# Patient Record
Sex: MALE | Race: WHITE | URBAN - METROPOLITAN AREA
[De-identification: names, ages, dates, MRNs, and addresses within clinical notes are randomized per-mention and may not be internally consistent; named-entity substitution may affect disease eponyms.]

---

## 2020-05-07 ENCOUNTER — HISTORICAL (OUTPATIENT)
Dept: ADMINISTRATIVE | Facility: HOSPITAL | Age: 35
End: 2020-05-07

## 2020-07-15 ENCOUNTER — HISTORICAL (OUTPATIENT)
Dept: SURGERY | Facility: HOSPITAL | Age: 35
End: 2020-07-15

## 2022-04-10 ENCOUNTER — HISTORICAL (OUTPATIENT)
Dept: ADMINISTRATIVE | Facility: HOSPITAL | Age: 37
End: 2022-04-10

## 2022-04-30 VITALS
SYSTOLIC BLOOD PRESSURE: 141 MMHG | SYSTOLIC BLOOD PRESSURE: 148 MMHG | DIASTOLIC BLOOD PRESSURE: 85 MMHG | WEIGHT: 270.06 LBS | BODY MASS INDEX: 33.58 KG/M2 | HEIGHT: 75 IN | DIASTOLIC BLOOD PRESSURE: 91 MMHG | WEIGHT: 258 LBS | BODY MASS INDEX: 32.08 KG/M2 | HEIGHT: 75 IN

## 2022-05-02 NOTE — HISTORICAL [facility]
This is a historical note converted from Mode. Formatting and pictures may have been removed.  Please reference Mode for original formatting and attached multimedia. OPERATIVE REPORT  ?  DATE: 7/15/2020  ?  ASSISTANT: Pam Bello,?certified surgical assistant  ?  PREOPERATIVE DIAGNOSIS:  1. 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 Os acromiale  3.? Impingement syndrome  4.? Bicipital?tendinitis?and pain  ?  POSTOPERATIVE DIAGNOSIS:  Same  ?  PROCEDURES:  1. ?Right shoulder diagnostic anoscopy  2.? Distal clavicle resection  3.? Subacromial decompression  4.? Arthroscopic biceps tenotomy  ?  ANESTHESIA:  General anesthesia with?supraclavicular nerve block  ?  BLOOD LOSS:  Less than 30 cc  ?  DVT PROPHYLAXIS:  Aspirin daily for 10 days  ?  INSTRUMENTATION:  None  ?  PROCEDURE IN DETAIL:  ?  Diagnostic arthroscopy of shoulder  ?  The examination under anesthesia was performed for skin defects and other contraindications and none were found.The patient was carefully placed in a lateral decubitus position. All bony prominences were padded and sterile U-drape was applied. Patients operative extremity was placed in suspension device with 7-10lbs of weight applied. The skin was prepped in normal sterile fashion. A time out was performed to confirm correct operative extremity, allergies, and antibiotic infusion. All portals were made with an 11-blade and an 18-gauge spinal needle was used to help probe and find proper alignment for the portals. After creating posterior portal, an arthroscope was inserted into the glenohumeral joint. A diagnostic arthroscopy was then performed in this sequential order: biceps anchor, rotator interval, subscapularis tendon, superior glenohumeral ligament, middle glenohumeral ligament, inferior glenohumeral ligament, anterior and inferior capsular attachments, anterior, inferior, and posterior labrum, teres minor tendon, infraspinatus tendon, and supraspinatus tendon, and biceps  tendon in the rotator interval.  ?  The certified assistant provided critical assistance by holding instruments including the arthroscope to provide adequate visualization of the procedure, as well as assisting in wound closure.  ?  At the end of the procedure the portals were closed with mastisol around the wound and placement of steri-strips. The patient tolerated the procedure well and taken to the recovery room in good condition.  ?  Arthroscopic biceps Tenotomy  ?  Viewing through posterior portal, the base of the biceps tendon visualized and subluxed. The tendon was transected with arthroscopic scissors through the anterior portal.  ?  Distal clavicle resection  ?  A distal clavicle resection was then carried out. Viewing from the posterior portal, the shaver was used to debride the acromioclavicular joint and associated soft tissues through the anterior portal. Once cleared of the soft tissue, the nava was used to resect the distal end of the clavicle concentrating initially upon the inferior and distal clavicular spurs. The resection was then carried superiorly to remove the upper end of the clavicle to remove approximately 5-7mm of spurs. Then the nava was used on the acromion to remove 2-3mm of bone. The resection was then measured with a probe to confirm the 8-10mm of resection.?  ?  Subacromial decompression  ?  Viewing from the anterior lateral portal, we could visualize the anterolateral aspect of the acromion. ?The nava was placed in the posterior portal and the decompression was started at the anterolateral corner of the acromion. ?We moved in a medial to lateral configuration and moved from anterior to posterior to do the decompression. ?This is described as the cutting block technique  ???  A 3 cm incision was made into the axillary fold. I carefully slid a blunt Hohmann over the lateral edge of the humerus just under the pectoralis tendon. The arm was externally rotated and I cannot visualize the  biceps tendon.??It was not located in the traditional group.??I believe this is because?this patient had a chronic?pectoralis?tendon rupture and the pectoralis tendon actually acts as a?bolster for the?biceps tendon.? So since this patient?had a pectoralis?rupture?the?biceps?subluxed out of the groove and was not able to be visualized.  ?

## 2022-05-02 NOTE — HISTORICAL [facility]
This is a historical note converted from Mode. Formatting and pictures may have been removed.  Please reference Mode for original formatting and attached multimedia. Chief Complaint  New patient here with right shoulder- pec muscle tear. DOI: 12/5/19 Bench pressing. Doing PTwith [facility]. States does help at time. Xrays today.  History of Present Illness  This is a 35-year-old male that is in the , specifically the [employer], that?comes in with a 5-month history of a pectoralis?tendon tear.? 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 He did have somewhat evaluated?but he decided to treat it conservatively.? He has been in therapy recently for the last 4 to 6 weeks but he still continues to have?issues. ?His major problem is pain over the anterolateral aspect of the superior shoulder?and also some pain within the bicipital groove.? He does notice some weakness in his?right?pectoralis muscle.  Review of Systems  GENERAL: No fevers, chills, unexpected weight loss or gain  MUSCULOSKELETAL: Joint pain, extremity pain  Physical Exam  Vitals & Measurements  HR:?76(Peripheral)? BP:?148/85?  HT:?190?cm? WT:?122.5?kg? BMI:?33.93?  General: Well-developed, well-nourished.  Neuro: Alert and oriented x 3.  Psych: Normal mood and affect.  CV: Palpable radial pulses.  Resp: Smooth and unlabored.  Skin: No evidence of focal lesions or trauma.  Hem/Imm/Lymph: No evidence of lymphangitis or adenopathy.  Gait: No trendelenburg gait.  DTR: 2+, no hypo or hyperreflexia.  Coordination and Balance: No tremors or abnormal station.?  Shoulder Exam:  No obvious deformity.? Atrophy of the?sternal head of the right?pectoralis muscle. ?No medial or lateral scapula winging. Forward flexion to 170 degrees and abduction to 170 degrees and external rotation 80 degrees is and internal rotation is 80 degrees. Negative empty can, Whipple, Drop Arm Test, James, positive?impingement, positive?AC joint tenderness. Negative biceps ?groove tenderness,  Duran?s, Yergason?s, Speed test. Negative Apprehension and Relocation test. 5/5 strength, normal skin appearance and palpable pulses distally. Sensibility normal.  Assessment/Plan  1.?Os acromiale of right shoulder?M25.811  ?We discussed multiple options and he has decided he wants to?proceed with surgery. ?He wants to discuss this with his wife initially.? I gave him some reading material?to review. ?I will call us back when he is?ready to proceed with surgery. ?The goal be to do a?sub-pec tenodesis?and an arthroscopic?distal clavicle resection of his os acromiale?and a subacromial decompression.  Ordered:  Office/Outpatient Visit Level 3 New [id number] PC, Os acromiale of right shoulder  Biceps tendinitis on right  Pectoral muscle rupture, [facility], 05/07/20 14:48:00 CDT  ?  2.?Biceps tendinitis on right?M75.21  Ordered:  Office/Outpatient Visit Level 3 New [id number] PC, Os acromiale of right shoulder  Biceps tendinitis on right  Pectoral muscle rupture, [facility], 05/07/20 14:48:00 CDT  ?  3.?Pectoral muscle rupture?S29.011A  ?I believe he does have a pectoralis muscle rupture as well?but unfortunately he is 5 months out from this injury.? There is really nothing we can do at this time.  Ordered:  Office/Outpatient Visit Level 3 New [id number] [facility], Os acromiale of right shoulder  Biceps tendinitis on right  Pectoral muscle rupture, [facility], 05/07/20 14:48:00 CDT  ?   Problem List/Past Medical History  Ongoing  Obesity  Historical  No qualifying data  Procedure/Surgical History  Tonsil   Medications  No active medications  Allergies  No Known Medication Allergies  Social History  Abuse/Neglect  No, 05/07/2020  Alcohol  Current, Beer, Liquor, 05/07/2020  Substance Use  Never, 05/07/2020  Tobacco  Never (less than 100 in lifetime), No, 05/07/2020  Health Maintenance  Health Maintenance  ???Pending?(in the next year)  ??? ??OverDue  ??? ? ? ?Alcohol Misuse Screening due??01/01/20??and  every 1??year(s)  ??? ??Due?  ??? ? ? ?Tetanus Vaccine due??05/07/20??and every 10??year(s)  ??? ??Due In Future?  ??? ? ? ?Obesity Screening not due until??01/01/21??and every 1??year(s)  ???Satisfied?(in the past 1 year)  ??? ??Satisfied?  ??? ? ? ?ADL Screening on??05/07/20.??Satisfied by Ruth Hickey L. L.  ??? ? ? ?Blood Pressure Screening on??05/07/20.??Satisfied by Ruth Hickey L.  ??? ? ? ?Body Mass Index Check on??05/07/20.??Satisfied by Ruth Hickey L. L.  ??? ? ? ?Obesity Screening on??05/07/20.??Satisfied by Ruth Hickey LAngelita L.  ?

## 2025-03-31 ENCOUNTER — OFFICE VISIT (OUTPATIENT)
Dept: ORTHOPEDICS | Facility: CLINIC | Age: 40
End: 2025-03-31
Payer: OTHER GOVERNMENT

## 2025-03-31 VITALS
SYSTOLIC BLOOD PRESSURE: 157 MMHG | WEIGHT: 270 LBS | DIASTOLIC BLOOD PRESSURE: 96 MMHG | HEART RATE: 130 BPM | HEIGHT: 75 IN | BODY MASS INDEX: 33.57 KG/M2

## 2025-03-31 DIAGNOSIS — S76.112A RUPTURE OF LEFT QUADRICEPS TENDON, INITIAL ENCOUNTER: ICD-10-CM

## 2025-03-31 DIAGNOSIS — M25.562 LEFT KNEE PAIN, UNSPECIFIED CHRONICITY: Primary | ICD-10-CM

## 2025-03-31 PROCEDURE — 99203 OFFICE O/P NEW LOW 30 MIN: CPT | Mod: ,,, | Performed by: ORTHOPAEDIC SURGERY

## 2025-03-31 RX ORDER — AMLODIPINE BESYLATE 5 MG/1
5 TABLET ORAL EVERY MORNING
COMMUNITY
Start: 2025-03-18

## 2025-03-31 NOTE — PROGRESS NOTES
"Subjective:    CC: Pain of the Left Knee (L knee pain. Started hurting 3/17/25, went to ER a few days after. Pt seems to think he tweaked it as he was going up the stairs, felt like something rolled across knee. Couldn't bend it towards the end of day and had a ridge at the top of his patella. Had a lot of swelling. Has tingling at times. Flared up again this morning but pain came back when he kneeled. )       HPI:  Patient comes in today for his 1st visit.  Patient complains of left knee pain.  Patient states he twisted his knee, had severe pain, difficulty with any type of walking and bending, was seen in the ER, continues to have painful swelling, popping in his knee.  He states over the last couple of weeks, it has not improved.    ROS: Refer to HPI for pertinent ROS. All other 12 point systems negative.    Objective:  Vitals:    03/31/25 1458   BP: (!) 157/96   BP Location: Left arm   Patient Position: Sitting   Pulse: (!) 130   Weight: 122.5 kg (270 lb)   Height: 6' 3" (1.905 m)        Physical Exam:  The patient is well-nourished, well-developed and in no apparent distress, pleasant and cooperative. Examination of the left lower extremity compartments are soft and warm. Skin is intact. There are no signs or symptoms of DVT or infection. There is a small joint effusion. There is no erythema. Tender to palpation along the superior pole of the patella, pain with any flexion of his knee, he is tender along the quad tendon mainly , left knee range of motion is 0-95. The knee is stable to exam with varus and valgus stressing. Negative anterior and posterior drawer. Negative Lachman´s.  Negative Ovi's test. Patella grind is positive, Negative for apprehension. Neurovascularly intact distally.    Images:  X-rays three views left knee demonstrate no obvious fracture or dislocation. Images Reviewed and discussed with patient.    Assessment:  1. Left knee pain, unspecified chronicity  - X-Ray Knee 3 View Left; " Future    2. Rupture of left quadriceps tendon, initial encounter  - MRI Knee Without Contrast Left        Plan:  At this time we discussed his physical exam and x-ray findings.  I am concerned for at least a partial tear of the quad tendon.  Patient remains be symptomatic.  We will proceed with an MRI of his left knee.  I would like see back later this week for his results.    Follow UP: No follow-ups on file.

## 2025-03-31 NOTE — LETTER
[facility] [address]  Phone: [phone]  Fax: [phone]      Patient Name:Timmy Lynch YOB: 1985    Date: 3/31/25       The above mentioned patient was seen by me on 3/31/25. Patient is being treated for a partial quad tear. Please excuse him from the following activities:    -No running, strenuous activity, climbing, jumping, or carrying over 25 pounds.       Sarmad Li MD / Yenni Patel PA-C

## 2025-04-07 ENCOUNTER — OFFICE VISIT (OUTPATIENT)
Dept: ORTHOPEDICS | Facility: CLINIC | Age: 40
End: 2025-04-07
Payer: OTHER GOVERNMENT

## 2025-04-07 VITALS
BODY MASS INDEX: 33.58 KG/M2 | WEIGHT: 270.06 LBS | HEIGHT: 75 IN | HEART RATE: 110 BPM | DIASTOLIC BLOOD PRESSURE: 100 MMHG | SYSTOLIC BLOOD PRESSURE: 156 MMHG | TEMPERATURE: 98 F

## 2025-04-07 DIAGNOSIS — S76.112D RUPTURE OF LEFT QUADRICEPS TENDON, SUBSEQUENT ENCOUNTER: Primary | ICD-10-CM

## 2025-04-07 PROCEDURE — 99213 OFFICE O/P EST LOW 20 MIN: CPT | Mod: ,,, | Performed by: ORTHOPAEDIC SURGERY

## 2025-04-07 RX ORDER — TRAMADOL HYDROCHLORIDE 50 MG/1
50 TABLET ORAL EVERY 8 HOURS PRN
COMMUNITY
Start: 2025-03-18

## 2025-04-07 RX ORDER — HYDROCHLOROTHIAZIDE 12.5 MG/1
12.5 CAPSULE ORAL EVERY MORNING
COMMUNITY
Start: 2025-03-18

## 2025-04-08 NOTE — PROGRESS NOTES
"Subjective:    CC: Results of the Left Knee ( L knee MRi results , reports having some soreness and achy since MRI, denies any increase or decrease in pain, wbat, ambulates without assistance, )       HPI:  Patient returns today for repeat exam.  Patient had an MRI of his left knee.  We have discussed his results.    ROS: Refer to HPI for pertinent ROS. All other 12 point systems negative.    Objective:  Vitals:    04/07/25 1532   BP: (!) 156/100   BP Location: Left arm   Patient Position: Sitting   Pulse: 110   Temp: 98.2 °F (36.8 °C)   Weight: 122.5 kg (270 lb 1 oz)   Height: 6' 3" (1.905 m)        Physical Exam:  Left lower extremity compartment soft and warm.  Skin is intact.  There is no signs symptoms of DVT or infection.  He remains be tender along the superior pole of the patella.  He does have some discomfort with resisted knee flexion.  Otherwise he has full motion is 0-125 degrees negative instability, neurovascular intact distally.    Images:  MRI left knee was reviewed. Images Reviewed and discussed with patient.    Assessment:  1. Rupture of left quadriceps tendon, subsequent encounter        Plan:  At this time we discussed his physical exam and MRI findings.  We have discussed various treatment options.  We have discussed a knee brace, low-impact activities, anti-inflammatories with appropriate precautions.  I would like see him back in 4 weeks to see how he is progressing.  He will refrain from any strenuous activity.    Follow UP: No follow-ups on file.              "

## 2025-05-14 ENCOUNTER — OFFICE VISIT (OUTPATIENT)
Dept: ORTHOPEDICS | Facility: CLINIC | Age: 40
End: 2025-05-14
Payer: OTHER GOVERNMENT

## 2025-05-14 VITALS — HEIGHT: 75 IN | WEIGHT: 270.06 LBS | BODY MASS INDEX: 33.58 KG/M2

## 2025-05-14 DIAGNOSIS — M25.462 EFFUSION OF LEFT KNEE JOINT: ICD-10-CM

## 2025-05-14 DIAGNOSIS — S76.112D RUPTURE OF LEFT QUADRICEPS TENDON, SUBSEQUENT ENCOUNTER: Primary | ICD-10-CM

## 2025-05-14 PROCEDURE — 99213 OFFICE O/P EST LOW 20 MIN: CPT | Mod: ,,, | Performed by: ORTHOPAEDIC SURGERY

## 2025-05-15 NOTE — PROGRESS NOTES
"Subjective:    CC: Pain of the Left Knee (L knee / quad pain - pt states that he has good and bad days depending on what he does. He is still having swelling. )       HPI:  Patient returns today for repeat exam.  Patient states overall his knee is improving.  He states he still feels it some in his knee.  Occasional swelling, denies any new complaints.  We have discussed his previous MRI.    ROS: Refer to HPI for pertinent ROS. All other 12 point systems negative.    Objective:  Vitals:    05/14/25 0759   BP: Comment: patient hasnt taken his bp meds yet   Weight: 122.5 kg (270 lb 1 oz)   Height: 6' 3" (1.905 m)        Physical Exam:  The patient is well-nourished, well-developed and in no apparent distress, pleasant and cooperative. Examination of the left lower extremity compartments are soft and warm. Skin is intact. There are no signs or symptoms of DVT or infection. There is a small joint effusion. There is no erythema. Tender to palpation along the anterior aspect , left knee range of motion is 0-110. The knee is stable to exam with varus and valgus stressing. Negative anterior and posterior drawer. Negative Lachman´s.  Negative Ovi's test. Patella grind is positive, Negative for apprehension. Neurovascularly intact distally.    Images: . Images Reviewed and discussed with patient.    Assessment:  1. Rupture of left quadriceps tendon, subsequent encounter    2. Effusion of left knee joint        Plan:  At this time we discussed his physical exam and previous imaging findings.  We will continue conservative treatment.  We have discussed some knee exercises, letting pain be his guide.  I would like see him back in 4 weeks to see how he is progressing.    Follow UP: No follow-ups on file.              "

## 2025-06-18 ENCOUNTER — OFFICE VISIT (OUTPATIENT)
Dept: ORTHOPEDICS | Facility: CLINIC | Age: 40
End: 2025-06-18
Payer: OTHER GOVERNMENT

## 2025-06-18 DIAGNOSIS — M25.571 CHRONIC PAIN OF RIGHT ANKLE: Primary | ICD-10-CM

## 2025-06-18 DIAGNOSIS — M19.071 LOCALIZED OSTEOARTHRITIS OF RIGHT ANKLE: ICD-10-CM

## 2025-06-18 DIAGNOSIS — G89.29 CHRONIC PAIN OF RIGHT ANKLE: Primary | ICD-10-CM

## 2025-06-18 DIAGNOSIS — S76.112D RUPTURE OF LEFT QUADRICEPS TENDON, SUBSEQUENT ENCOUNTER: ICD-10-CM

## 2025-06-18 PROCEDURE — 99214 OFFICE O/P EST MOD 30 MIN: CPT | Mod: ,,, | Performed by: ORTHOPAEDIC SURGERY

## 2025-06-19 NOTE — PROGRESS NOTES
Subjective:    CC: Pain and Follow-up of the Left Knee (L knee pain. Pt states knee is getting better. Still has some slight pain but isn't constant. No swelling. Not taking anything for the pain. No new concerns with it. ) and Pain of the Right Ankle (R ankle pain. Pt states he went to [facility] and they found bone spurs on it and has slight wearing of the bone. Pain is only when he does high impact stuff and occasionally when he rolls it the wrong way. Has swelling if he does too much running. Riding a bike typically bothers it. No numbness or tingling. Primarily has sharp pains. Not taking anything for the pain. No other concerns with it)       HPI:  Patient returns today for repeat exam.  Patient states overall his left knee is feeling much better.  Patient has had a previous MRI, over 3 months ago, he denies any new complaints with his knee.  He states he has been having chronic pain about his right ankle.  He has been follow up at [facility] for this.  Patient we would like to perform wheeled out for his work duty.    ROS: Refer to HPI for pertinent ROS. All other 12 point systems negative.    Objective:  There were no vitals filed for this visit.     Physical Exam:  The patient is well-nourished, well-developed and in no apparent distress, pleasant and cooperative. Examination of the left lower extremity compartments are soft and warm. Skin is intact. There are no signs or symptoms of DVT or infection. There is no obvious joint effusion. There is no erythema. Tender to palpation along the anterior aspect very minimal if any , left knee range of motion is 0-120. The knee is stable to exam with varus and valgus stressing. Negative anterior and posterior drawer. Negative Lachman´s.  Negative Ovi's test. Patella grind is positive, Negative for apprehension. Neurovascularly intact distally.  Examination of the right foot and ankle, tender along the lateral and medial aspect of the right ankle joint.  He has 10° of  dorsiflexion 25° of plantar flexion.  Negative anterior drawer, no obvious pain with subtalar motion compartment were intact distally.    Images:  X-rays three views right ankle demonstrates degenerative changes. Images Reviewed and discussed with patient.    Assessment:  1. Chronic pain of right ankle  - X-Ray Ankle Complete Right; Future    2. Rupture of left quadriceps tendon, subsequent encounter    3. Localized osteoarthritis of right ankle        Plan:  At this time we have discussed his physical exam and x-ray findings.  He will obtain his old notes from [facility].  In regards to his left knee, I believe he has healed nicely.  He will continue low-impact activities, I would like see him back with any problems or difficulties    Follow UP: No follow-ups on file.

## 2025-07-17 ENCOUNTER — OFFICE VISIT (OUTPATIENT)
Dept: ORTHOPEDICS | Facility: CLINIC | Age: 40
End: 2025-07-17
Payer: OTHER GOVERNMENT

## 2025-07-17 VITALS
BODY MASS INDEX: 33.58 KG/M2 | DIASTOLIC BLOOD PRESSURE: 97 MMHG | HEART RATE: 99 BPM | SYSTOLIC BLOOD PRESSURE: 150 MMHG | WEIGHT: 270.06 LBS | HEIGHT: 75 IN

## 2025-07-17 DIAGNOSIS — M76.821 POSTERIOR TIBIAL TENDON DYSFUNCTION, RIGHT: ICD-10-CM

## 2025-07-17 DIAGNOSIS — M19.071 LOCALIZED OSTEOARTHRITIS OF RIGHT ANKLE: Primary | ICD-10-CM

## 2025-07-17 PROCEDURE — 99213 OFFICE O/P EST LOW 20 MIN: CPT | Mod: ,,, | Performed by: ORTHOPAEDIC SURGERY

## 2025-07-17 NOTE — LETTER
[facility] [address] [address]  Phone: [phone]  Fax: [phone]      Patient Name: Timmy Lynch      YOB: 1985    Date: 7/17/2025        The above mentioned patient was seen by me on 7/17/2025. Please allow patient to have different PT test (ie: rowing) due to his chronic ankle issues.   .    If you have any questions, please call our office - [phone].      Thank you,           Sarmad Li MD / Yenni Patel PA-C

## 2025-07-23 NOTE — PROGRESS NOTES
"Subjective:    CC: Follow-up of the Right Ankle (F/U R ankle pain. Pt states he's having sharp pains off and on. No swelling unless he uses it a lot. Not taking anything for the pain. Mobility is fine. Radiating up leg. No new concerns with it.)       HPI:  Patient comes in today for his right ankle.  Patient continues to have some sharp pains in his right ankle, intermittently.  He does have a longstanding history of this, he has been seen, outside clinic,  for this as well.  He denies any new or specific trauma, denies other complaints.    ROS: Refer to HPI for pertinent ROS. All other 12 point systems negative.    Objective:  Vitals:    07/17/25 0945   BP: (!) 150/97   Pulse: 99   Weight: 122.5 kg (270 lb 1 oz)   Height: 6' 3" (1.905 m)        Physical Exam:  Right lower extremity compartment soft and warm.  Skin is intact.  There is no signs or symptoms of DVT or infection.  There was no swelling, joint effusion.  He has dorsiflexion to 5° past neutral, 30° of plantar flexion.  He is tender along the medial and lateral gutters as well.  Grossly stable to stressing, neurovascular intact distally.    Images: . Images Reviewed and discussed with patient.    Assessment:  1. Localized osteoarthritis of right ankle    2. Posterior tibial tendon dysfunction, right        Plan:  At this time we discussed his physical exam and previous imaging findings.  We have discussed some low-impact activities, anti-inflammatories with appropriate precautions, appropriate shoe wear.  I would like see back with any problems or difficulties.    Follow UP: No follow-ups on file.              "